# Patient Record
Sex: MALE | Race: WHITE
[De-identification: names, ages, dates, MRNs, and addresses within clinical notes are randomized per-mention and may not be internally consistent; named-entity substitution may affect disease eponyms.]

---

## 2020-01-06 ENCOUNTER — HOSPITAL ENCOUNTER (EMERGENCY)
Dept: HOSPITAL 61 - ER | Age: 40
LOS: 1 days | Discharge: HOME | End: 2020-01-07
Payer: COMMERCIAL

## 2020-01-06 VITALS — BODY MASS INDEX: 28.23 KG/M2 | WEIGHT: 220 LBS | HEIGHT: 74 IN

## 2020-01-06 DIAGNOSIS — S01.81XA: ICD-10-CM
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
## 2020-01-06 DIAGNOSIS — S01.112A: Primary | ICD-10-CM
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
## 2020-01-06 DIAGNOSIS — R51: ICD-10-CM

## 2020-01-06 PROCEDURE — 99285 EMERGENCY DEPT VISIT HI MDM: CPT

## 2020-01-06 PROCEDURE — 90471 IMMUNIZATION ADMIN: CPT

## 2020-01-06 PROCEDURE — 72125 CT NECK SPINE W/O DYE: CPT

## 2020-01-06 PROCEDURE — 90715 TDAP VACCINE 7 YRS/> IM: CPT

## 2020-01-06 PROCEDURE — 70450 CT HEAD/BRAIN W/O DYE: CPT

## 2020-01-06 PROCEDURE — 70486 CT MAXILLOFACIAL W/O DYE: CPT

## 2020-01-06 PROCEDURE — 12013 RPR F/E/E/N/L/M 2.6-5.0 CM: CPT

## 2020-01-06 RX ADMIN — LIDOCAINE HYDROCHLORIDE,EPINEPHRINE BITARTRATE ONE ML: 20; .01 INJECTION, SOLUTION INFILTRATION; PERINEURAL at 23:00
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
## 2020-01-07 VITALS — DIASTOLIC BLOOD PRESSURE: 80 MMHG | SYSTOLIC BLOOD PRESSURE: 127 MMHG

## 2020-01-07 RX ADMIN — TETANUS TOXOID, REDUCED DIPHTHERIA TOXOID AND ACELLULAR PERTUSSIS VACCINE, ADSORBED ONE ML: 5; 2.5; 8; 8; 2.5 SUSPENSION INTRAMUSCULAR at 00:03
